# Patient Record
Sex: MALE | Race: BLACK OR AFRICAN AMERICAN | NOT HISPANIC OR LATINO | Employment: FULL TIME | URBAN - METROPOLITAN AREA
[De-identification: names, ages, dates, MRNs, and addresses within clinical notes are randomized per-mention and may not be internally consistent; named-entity substitution may affect disease eponyms.]

---

## 2017-07-19 ENCOUNTER — TELEPHONE (OUTPATIENT)
Dept: INTERNAL MEDICINE | Facility: CLINIC | Age: 27
End: 2017-07-19

## 2017-07-19 NOTE — TELEPHONE ENCOUNTER
----- Message from Veronika Paredes sent at 7/19/2017  8:20 AM CDT -----  Contact: [phone]  Patient would like to know if Dr. Christianson is able to provide a [facility] physical and drug screening.If he can not is there any Doctor at the [facility] that can provide the services needed for the patient. Please call back at [phone].      Thanks,  Veronika Paredes